# Patient Record
Sex: FEMALE | Race: WHITE | NOT HISPANIC OR LATINO | RURAL
[De-identification: names, ages, dates, MRNs, and addresses within clinical notes are randomized per-mention and may not be internally consistent; named-entity substitution may affect disease eponyms.]

---

## 2024-10-07 ENCOUNTER — OFFICE VISIT (OUTPATIENT)
Dept: FAMILY MEDICINE CLINIC | Facility: CLINIC | Age: 39
End: 2024-10-07
Payer: COMMERCIAL

## 2024-10-07 VITALS
SYSTOLIC BLOOD PRESSURE: 142 MMHG | HEART RATE: 87 BPM | OXYGEN SATURATION: 98 % | HEIGHT: 70 IN | BODY MASS INDEX: 19.18 KG/M2 | DIASTOLIC BLOOD PRESSURE: 84 MMHG | WEIGHT: 134 LBS

## 2024-10-07 DIAGNOSIS — Z13.220 SCREENING FOR HYPERLIPIDEMIA: ICD-10-CM

## 2024-10-07 DIAGNOSIS — Z11.59 ENCOUNTER FOR HEPATITIS C SCREENING TEST FOR LOW RISK PATIENT: ICD-10-CM

## 2024-10-07 DIAGNOSIS — Z00.00 GENERAL MEDICAL EXAM: Primary | ICD-10-CM

## 2024-10-07 DIAGNOSIS — R62.7 POOR WEIGHT GAIN IN ADULT: ICD-10-CM

## 2024-10-07 NOTE — PROGRESS NOTES
Office Note     Name: Blanquita Solano    : 1985     MRN: [id number]     Chief Complaint  Establish Care and Annual Exam (Pt is here for a PE today. She states that she has not been seen in around 10 years and just wants a general check up. )    Subjective     History of Present Illness:  Blanquita Solano is a 39 y.o. female who presents today for:    History of Present Illness  The patient is a 39-year-old female who presents to establish care and have a physical checkup.    She has not had a primary care physician previously, but her gynecologist is Dr. Mcgee. Her last mammogram was conducted a year ago, during which she experienced significant discomfort due to her dense breast tissue. This required her to undergo two mammograms and an ultrasound. She has undergone genetic testing for breast cancer and the results were negative.    She has no history of hospitalization except for tonsil-related issues. She does not use drugs, including intravenous drugs, inhalants, snorting substances, or sniffing substances. She has been pregnant four times, resulting in one miscarriage, two live births, and one .    She reports feeling energetic but struggles with focus. Her sleep is satisfactory, and she maintains a healthy diet and daily exercise routine. She has been attempting to gain weight to build muscle mass, but has lost 2 pounds recently. Her exercise regimen includes rock climbing and weight training. She consumes protein shakes to supplement her diet.    She does not experience anxiety or depression. She also reports no digestive issues such as constipation or diarrhea, and there is no swelling in her legs or ankles.    SOCIAL HISTORY  She used to smoke and then quit. She did cigarette for a while but that was just a year after she quit smoking. She drinks alcohol every other week. She is a stay-at-home mom. Her children are 8 and 7 years old.    FAMILY HISTORY  Her mother had breast cancer and  had double mastectomy. Her aunt  from breast cancer. They were all over 50. Her mother had hepatitis C.      Immunization History   Administered Date(s) Administered    Hep B, Adolescent or Pediatric 10/14/1999, 2000    Hepatitis B Adolescent High Risk Infant 1999    MMR 2017    Td (TDVAX) 10/14/1999      Colorectal Screening:     Last Completed Colonoscopy       This patient has no relevant Health Maintenance data.          Pap:    Last Completed Pap Smear            PAP SMEAR (Every 3 Years) Next due on 8/15/2027      08/15/2024  Done - [facility]    2016  SCANNED - PAP SMEAR                   Mammogram:    Last Completed Mammogram       This patient has no relevant Health Maintenance data.              Diet/Physical activity:Very physically active with weight and cardio    Depression: PHQ-2 Depression Screening  Little interest or pleasure in doing things? 0-->not at all   Feeling down, depressed, or hopeless? 0-->not at all   PHQ-2 Total Score 0         Review of Systems:   Review of Systems    Past Medical History: History reviewed. No pertinent past medical history.    Past Surgical History:   Past Surgical History:   Procedure Laterality Date    INDUCED       NASAL SEPTUM SURGERY      TONSILLECTOMY  2000    WISDOM TOOTH EXTRACTION         Family History:   Family History   Problem Relation Age of Onset    Breast cancer Mother 58    Breast cancer Maternal Aunt        Social History:   Social History     Socioeconomic History    Marital status: Single   Tobacco Use    Smoking status: Former     Current packs/day: 0.00     Types: Cigarettes     Quit date: 2000     Years since quittin.7     Passive exposure: Past    Smokeless tobacco: Never   Vaping Use    Vaping status: Never Used   Substance and Sexual Activity    Alcohol use: Yes     Alcohol/week: 1.0 standard drink of alcohol     Types: 1 Cans of beer per week     Comment: Monthly    Drug  "use: Not Currently    Sexual activity: Yes     Partners: Male     Birth control/protection: None       Immunizations:   Immunization History   Administered Date(s) Administered    Hep B, Adolescent or Pediatric 10/14/1999, 05/09/2000    Hepatitis B Adolescent High Risk Infant 12/08/1999    MMR 06/04/2017    Td (TDVAX) 10/14/1999        Medications:     Current Outpatient Medications:     Prenatal Vit-Fe Fumarate-FA (PRENATAL, CLASSIC, VITAMIN) 28-0.8 MG tablet tablet, Take 1 tablet by mouth daily. (Patient not taking: Reported on 10/7/2024), Disp: , Rfl:     Allergies:   No Known Allergies    Objective     Vital Signs  /84   Pulse 87   Ht 177.8 cm (70\")   Wt 60.8 kg (134 lb)   SpO2 98%   BMI 19.23 kg/m²   Estimated body mass index is 19.23 kg/m² as calculated from the following:    Height as of this encounter: 177.8 cm (70\").    Weight as of this encounter: 60.8 kg (134 lb).    Vital Signs were reviewed.    BMI is within normal parameters. No other follow-up for BMI required.      Physical Exam  Vitals and nursing note reviewed.   Constitutional:       Appearance: Normal appearance.   Neck:      Thyroid: No thyromegaly.   Cardiovascular:      Rate and Rhythm: Normal rate and regular rhythm.      Heart sounds: No murmur heard.     No friction rub. No gallop.   Pulmonary:      Effort: Pulmonary effort is normal.      Breath sounds: Normal breath sounds. No wheezing, rhonchi or rales.   Neurological:      Mental Status: She is alert.        Physical Exam         Results      Procedures     Assessment and Plan     Diagnoses:    ICD-10-CM ICD-9-CM   1. General medical exam  Z00.00 V70.9   2. Encounter for hepatitis C screening test for low risk patient  Z11.59 V73.89   3. Screening for hyperlipidemia  Z13.220 V77.91   4. Poor weight gain in adult  R62.7 783.7       Plan:    1. General medical exam      2. Encounter for hepatitis C screening test for low risk patient    - Hepatitis C Antibody    3. Screening " for hyperlipidemia    - Lipid Panel    4. Poor weight gain in adult    - Comprehensive Metabolic Panel  - TSH Rfx On Abnormal To Free T4       Assessment & Plan  1. Establishment of care.  Her Pap smear is current, and a mammogram is being arranged. Given her difficulty in gaining weight, a thyroid function test will be conducted to rule out hyperthyroidism. Additionally, cholesterol, glucose, liver, and kidney function tests will be performed to ensure overall health. A one-time hepatitis C screening will also be conducted due to her family history. Blood work will be ordered today. No new medications will be initiated at this time.    2. Health Maintenance.  She is advised to schedule her next mammogram. The importance of annual mammograms, especially given her family history of breast cancer, was discussed. She is also advised to ensure she is up to date with the Tdap vaccine, as it is flagged as due.         Healthcare Maintenance:     Blanquita Solano voices understanding and acceptance of this advice and will call back with any further questions or concerns. AVS with preventive healthcare tips printed for patient.     Counseling Provided  40 to 64: Counseling/Anticipatory Guidance Discussed: immunizations     Follow Up  Return in about 1 year (around 10/7/2025).    Patient was advised to call the office or seek medical care if  any issues discussed during this visit worsen or persist or if new concerns arise    Patient or patient representative verbalized consent for the use of Ambient Listening during the visit with  Elizabeth Alberts MD for chart documentation. 10/7/2024  09:50 EDT    Elizabeth Alberts MD  [facility]  [address] [place] [id number] [phone]

## 2024-10-08 LAB
ALBUMIN SERPL-MCNC: 4.6 G/DL (ref 3.9–4.9)
ALP SERPL-CCNC: 61 IU/L (ref 44–121)
ALT SERPL-CCNC: 14 IU/L (ref 0–32)
AST SERPL-CCNC: 19 IU/L (ref 0–40)
BILIRUB SERPL-MCNC: 0.6 MG/DL (ref 0–1.2)
BUN SERPL-MCNC: 13 MG/DL (ref 6–24)
BUN/CREAT SERPL: 19 (ref 9–23)
CALCIUM SERPL-MCNC: 9.4 MG/DL (ref 8.7–10.2)
CHLORIDE SERPL-SCNC: 103 MMOL/L (ref 96–106)
CHOLEST SERPL-MCNC: 148 MG/DL (ref 100–199)
CO2 SERPL-SCNC: 25 MMOL/L (ref 20–29)
CREAT SERPL-MCNC: 0.68 MG/DL (ref 0.57–1)
EGFRCR SERPLBLD CKD-EPI 2021: 113 ML/MIN/1.73
GLOBULIN SER CALC-MCNC: 2.3 G/DL (ref 1.5–4.5)
GLUCOSE SERPL-MCNC: 91 MG/DL (ref 70–99)
HCV IGG SERPL QL IA: NON REACTIVE
HDLC SERPL-MCNC: 66 MG/DL
LDLC SERPL CALC-MCNC: 70 MG/DL (ref 0–99)
POTASSIUM SERPL-SCNC: 4 MMOL/L (ref 3.5–5.2)
PROT SERPL-MCNC: 6.9 G/DL (ref 6–8.5)
SODIUM SERPL-SCNC: 142 MMOL/L (ref 134–144)
TRIGL SERPL-MCNC: 56 MG/DL (ref 0–149)
TSH SERPL DL<=0.005 MIU/L-ACNC: 1.84 UIU/ML (ref 0.45–4.5)
VLDLC SERPL CALC-MCNC: 12 MG/DL (ref 5–40)

## 2024-10-18 ENCOUNTER — PATIENT ROUNDING (BHMG ONLY) (OUTPATIENT)
Dept: FAMILY MEDICINE CLINIC | Facility: CLINIC | Age: 39
End: 2024-10-18
Payer: COMMERCIAL

## 2024-10-18 NOTE — PROGRESS NOTES
October 18, 2024    Hello, may I speak with Blanquita Solano?    My name is Dee      I am  with [facility]  [address]  [facility] [address] [phone].    Before we get started may I verify your date of birth? 1985    I am calling to officially welcome you to our practice and ask about your recent visit. Is this a good time to talk? yes    Tell me about your visit with us. What things went well?  Dr Ablerts was very friendly and professional, she explained everything very well and listened to her concerns       We're always looking for ways to make our patients' experiences even better. Do you have recommendations on ways we may improve?  no    Overall were you satisfied with your first visit to our practice? yes       I appreciate you taking the time to speak with me today. Is there anything else I can do for you? no      Thank you, and have a great day.

## 2025-03-26 ENCOUNTER — E-VISIT (OUTPATIENT)
Dept: FAMILY MEDICINE CLINIC | Facility: TELEHEALTH | Age: 40
End: 2025-03-26
Payer: COMMERCIAL

## 2025-03-26 PROCEDURE — FABRICHEALTHVISIT: Performed by: NURSE PRACTITIONER

## 2025-03-26 NOTE — E-VISIT TREATED
Date: 2025 14:27:32  Clinician: Amanda Montilla  Clinician NPI: [id number]  Patient: Blanquita Mckeon  Patient : 1985  Patient Address: [address], [place] [address]  Patient Phone: [phone]  Visit Protocol: Acne and rosacea  Patient Summary:  Blanquita is a 39 year old ( : 1985 ) female who initiated a visit for acne.    Images of the skin condition were uploaded.  Blanquita is not using prescription treatment for acne, but did in the past.  Blanquita was diagnosed with acne   more than 5 years ago. The last in-person visit with a provider for this condition was more than 2 years ago.   Symptom details  The symptoms are located on the cheeks and on the chin.   Present symptoms:     Whiteheads    Cysts: 2-4 are present     Redness, flushing or dusky brown discoloration    Dry patches of skin that appear swollen    Scarring    Keloids     Blanquita has a combination of dry and oily skin and is Type 3: fair to medium natural skin. Blanquita is not sure if their skin is sensitive.   Denies: burning sensation of the skin, thickened areas of the skin, small, raised bumps (red or yellowish brown   in color), red lines or patterns on the skin, blackheads, and eye irritation.  Blanquita has a history of dry patches of skin that appear swollen, whiteheads, and cysts.  Pertinent medical history  Blanquita does not have excessive hair growth on unexpected   areas of the body, such as the upper lip, under the chin, chest, and back. Blanquita does not have male pattern hair loss (hair loss on the top or front of the scalp).   The acne regularly worsens at the same points during the menstrual cycle.   Blanquita is   not currently taking an oral contraceptive.   Blanquita would not consider taking an oral contraceptive to treat acne.   The following acne treatments have been effective in the past:   Oral antibiotics   Blanquita has previously used over-the-counter acne   treatments.  The over-the-counter acne  treatments have not been effective.    Blanquita does not smoke or use smokeless tobacco.   Blanquita denies pregnancy and denies breastfeeding.     MEDICATIONS: No current medications, ALLERGIES: NKDA  Clinician Response:  Dear Blanquita,  Based on the information you have provided, you have moderate acne.  Acne is caused by dead skin cells building up in the pores. When dead skin cells combine with oil, the pores become blocked, causing bacteria to   become trapped and pimples to form. Acne treatment is aimed at clearing the dead skin cells, excess oils, and bacteria from the pores.  Although many believe it to be true, acne is not caused by uncleanliness. In fact, washing your face too much can   cause irritation making acne worse.  Medication information  I am going to provide you with treatment today, but there are more aggressive therapies for severe acne. However, you need to be seen in person to determine whether any of these are right for   you.  If you would like to discuss further options, please make an appointment to be seen in the clinic.  I am prescribing:     Clindamycin phosphate (Cleocin T) 1% topical lotion. Apply to the affected skin 2 times per day. Your prescription includes 3 refills.   If you become pregnant during this course of treatment, stop taking the medication and contact your primary care   provider.  Unless you are allergic to the over-the-counter medication(s) below, I recommend using:     Benzoyl peroxide 2.5% topical cream. Apply daily in the morning to clean, dry skin.   Over-the-counter medications do not require a prescription. Ask the pharmacist if you have any questions.  If you are using a treatment you have not used before, apply   a small amount of the product to 1 or 2 small affected areas of the skin for 3 days. If no discomfort or reaction occurs, you may use the product on commonly affected areas of the face. Do not use the products as a spot treatment.    Both prescription and  over-the-counter medication can cause an allergic reaction even if you have taken them without a problem in the past. If you develop a new rash, swelling, or difficulty breathing, stop the medication and be seen in a clinic or   urgent care immediately.  Self care  Take the following steps to best control your condition:     Avoid washing the skin more than 2 times per day    Avoid touching blemishes, as this leads to more redness and irritation    If your skin becomes irritated, use a gentle, fragrance-free moisturizer containing aloe vera.    Be careful when using dietary supplements, such as whey protein, that can worsen acne.    A broad-spectrum sunscreen, SPF 30 or higher, is recommended for all skin types. Reapply every 2 hours.    Decreasing your sugar intake and eating more citrus fruits and green leafy vegetables will reduce inflammation and help in regulating hormone levels    Aim for 8 glasses of water per day for healthy, clear skin    Avoid processed foods such as white bread, potato chips, and fries    Oil-based hair products are a potential cause of breakouts on the forehead. Applying products only to the mid-scalp and ends of the hair can help in prevention     When to seek care  Please make an appointment to be seen in a clinic if any of the following occur:     You have only minimal improvement after 8 to 12 weeks of treatment    You experience side effects that make following the recommendations in this treatment plan difficult    You need help coping with your condition      Diagnosis: Moderate acne  Diagnosis ICD: L70.0    Follow up instructions: ATTENTION: If you have been prescribed medications, your prescriptions will not be sent until you choose your pharmacy.  To do so open the link within your notification, or go to [employer] and click eVisit in the menu to open your   treatment plan. From there, you can select your pharmacy at the bottom of your after visit summary. You can also go to  [email or web address]  Prescriptions  Prescription: clindamycin phosphate (Cleocin T) 1 % topical lotion, apply to the affected skin 2 times per day

## 2025-03-28 ENCOUNTER — TELEMEDICINE (OUTPATIENT)
Dept: FAMILY MEDICINE CLINIC | Facility: TELEHEALTH | Age: 40
End: 2025-03-28
Payer: COMMERCIAL

## 2025-03-28 DIAGNOSIS — L70.9 ACNE, UNSPECIFIED ACNE TYPE: Primary | ICD-10-CM

## 2025-03-28 RX ORDER — CLINDAMYCIN PHOSPHATE 10 MG/G
1 GEL TOPICAL 2 TIMES DAILY PRN
Qty: 60 G | Refills: 0 | Status: SHIPPED | OUTPATIENT
Start: 2025-03-28 | End: 2025-04-27

## 2025-03-28 NOTE — PROGRESS NOTES
No chief complaint on file.      Video Visit Reason:   Free Text Description: cystic ance and rash. would like bactrim,as it has worked well in the past  Subjective   Blanquita Solano is a 39 y.o. female.     History of Present Illness  The patient presents via virtual visit for evaluation of cystic acne. She is requesting bactrim as treatment for her acne. She reports that she has had it in the past and it has worked for her in the past.    She has not previously utilized clindamycin gel as a treatment option.    ALLERGIES  The patient has no known allergies.        The following portions of the patient's history were reviewed and updated as appropriate: allergies, current medications, past medical history, and problem list.      History reviewed. No pertinent past medical history.  Social History     Socioeconomic History    Marital status:    Tobacco Use    Smoking status: Former     Current packs/day: 0.00     Types: Cigarettes     Quit date: 2000     Years since quittin.2     Passive exposure: Past    Smokeless tobacco: Never   Vaping Use    Vaping status: Never Used   Substance and Sexual Activity    Alcohol use: Yes     Alcohol/week: 1.0 standard drink of alcohol     Types: 1 Cans of beer per week     Comment: Monthly    Drug use: Not Currently    Sexual activity: Yes     Partners: Male     Birth control/protection: None     medication documentation: reviewed and updated with patient and   Current Outpatient Medications:     clindamycin (Clindamycin 1% external gel) 1 % gel, Apply 1 Application topically to the appropriate area as directed 2 (Two) Times a Day As Needed (acne) for up to 30 days., Disp: 60 g, Rfl: 0  Review of Systems  See HPI  Objective   Physical Exam  Constitutional:       General: She is not in acute distress.  Skin:     Findings: Acne present.   Neurological:      Mental Status: She is alert.         Assessment & Plan   Diagnoses and all orders for this visit:    1. Acne,  unspecified acne type (Primary)  -     clindamycin (Clindamycin 1% external gel) 1 % gel; Apply 1 Application topically to the appropriate area as directed 2 (Two) Times a Day As Needed (acne) for up to 30 days.  Dispense: 60 g; Refill: 0     Her facial skin does not exhibit signs of infection. A prescription for clindamycin gel has been issued. She has been thoroughly educated as to why bactrim is not the best option for typical acne. She has been advised to consult with her primary care physician for further evaluation and potential referral to a dermatologist. Information regarding the medication and its usage will be sent to her [employer]. If there are any issues at the pharmacy, she should message through [employer] for assistance.             Follow Up:  If your symptoms are not resolving by the completion of your treatment or are worsening, see your primary care provider for follow up. If you don't have a primary care provider, you may go to any Urgent Care for re-evaluation. If you develop any life threatening symptoms, go to the nearest Emergency Department immediately or call EMS.       Patient or patient representative verbalized consent for the use of Ambient Listening during the visit with  LYNNETTE Barrett for chart documentation. 3/28/2025  10:05 EDT        The use of  Video Visit was utilized during this visit, using both [employer] and [facility]. The use of a video visit has been reviewed with the patient and verbal informed consent has been obtained. No technical difficulties occurred during the visit.    is located at [facility] [address] [facility] [address]  Provider is located at [place], [place]

## 2025-03-28 NOTE — PATIENT INSTRUCTIONS
It is recommended that you follow up with PCP or Dermatologist for progress check ups and refills if needed.      Clindamycin Topical Gel or Solution  What is this medication?  CLINDAMYCIN (ANDRY Pichardo) treats acne. It works by killing or preventing the growth of bacteria on your skin. It belongs to a group of medications called antibiotics.  This medicine may be used for other purposes; ask your health care provider or pharmacist if you have questions.  COMMON BRAND NAME(S): Cleocin T, Clinda-Derm, Clindagel, ClindaMax  What should I tell my care team before I take this medication?  They need to know if you have any of these conditions:  Inflammatory bowel disease, such as Crohn disease or ulcerative colitis  Large areas of burned or damaged skin  Skin conditions or sensitivity  Stomach or intestine problems  An unusual or allergic reaction to clindamycin, other medications, foods, dyes, or preservatives  Pregnant or trying to get pregnant  Breastfeeding  How should I use this medication?  This medication is for external use only. Do not take by mouth. Wash your hands before and after use. If you are treating a hand infection, only wash your hands before use. Do not get it in your eyes. If you do, rinse your eyes with plenty of cool tap water. Use it as directed on the prescription label at the same time every day. Do not use it more often than directed. Use the medication for the full course as directed by your care team, even if you think you are better. Do not stop using it unless your care team tells you to stop it early.  Apply a thin film of the medication to the affected area.  Talk to your care team about the use of this medication in children. Special care may be needed.  Overdosage: If you think you have taken too much of this medicine contact a poison control center or emergency room at once.  NOTE: This medicine is only for you. Do not share this medicine with others.  What if I miss a dose?  If you  miss a dose, take it as soon as you can. If it is almost time for your next dose, take only that dose. Do not take double or extra doses.  What may interact with this medication?  Medicated cosmetics, including coverup preparations  Other acne products including benzoyl peroxide, salicylic acid, or tretinoin  Skin care products that have a high alcohol content (some shaving creams, lotions, or after shave lotion)  Some skin cleansers or medicated soaps  This list may not describe all possible interactions. Give your health care provider a list of all the medicines, herbs, non-prescription drugs, or dietary supplements you use. Also tell them if you smoke, drink alcohol, or use illegal drugs. Some items may interact with your medicine.  What should I watch for while using this medication?  Visit your care team for regular checks on your progress. It may be some time before you see the benefit from this medication. Tell your care team if your symptoms do not start to get better or if they get worse. Do not treat diarrhea with over the counter products. Contact your care team if you have diarrhea that lasts more than 2 days or if it is severe and watery.  What side effects may I notice from receiving this medication?  Side effects that you should report to your care team as soon as possible:  Allergic reactions--skin rash, itching, hives, swelling of the face, lips, tongue, or throat  Severe diarrhea, fever  Side effects that usually do not require medical attention (report these to your care team if they continue or are bothersome):  Mild skin irritation, redness, or dryness  This list may not describe all possible side effects. Call your doctor for medical advice about side effects. You may report side effects to FDA at 9-516-FDA-3788.  Where should I keep my medication?  Keep out of the reach of children and pets. Store at room temperature between 20 and 25 degrees C (68 and 77 degrees F). Do not freeze. Throw away  any unused medication after the expiration date.  NOTE: This sheet is a summary. It may not cover all possible information. If you have questions about this medicine, talk to your doctor, pharmacist, or health care provider.  © 2024 [employer] (2024-11-29 00:00:00)

## 2025-04-08 ENCOUNTER — OFFICE VISIT (OUTPATIENT)
Dept: FAMILY MEDICINE CLINIC | Facility: CLINIC | Age: 40
End: 2025-04-08
Payer: COMMERCIAL

## 2025-04-08 VITALS
DIASTOLIC BLOOD PRESSURE: 68 MMHG | HEIGHT: 70 IN | BODY MASS INDEX: 20.04 KG/M2 | OXYGEN SATURATION: 99 % | HEART RATE: 82 BPM | WEIGHT: 140 LBS | SYSTOLIC BLOOD PRESSURE: 110 MMHG

## 2025-04-08 DIAGNOSIS — L71.9 ROSACEA: Primary | ICD-10-CM

## 2025-04-08 PROCEDURE — 99213 OFFICE O/P EST LOW 20 MIN: CPT | Performed by: INTERNAL MEDICINE

## 2025-04-08 RX ORDER — METRONIDAZOLE 10 MG/G
GEL TOPICAL
Qty: 60 G | Refills: 1 | Status: SHIPPED | OUTPATIENT
Start: 2025-04-08

## 2025-04-08 NOTE — PROGRESS NOTES
Office Note     Name: Blanquita Solano    : 1985     MRN: [id number]     Chief Complaint  Acne (Pt saw the dermatologist recently and they prescribed clindamycin gel and it's not helping.)    Subjective     History of Present Illness:  Blanquita Solano is a 39 y.o. female who presents today for:      Patient recently was seen by virtual visit telemedicine nurse practitioner for acne diagnosis and was treated with 1% topical clindamycin gel approximately 5 to 6 weeks ago    Review of Systems:   Review of Systems    Past Medical History:   Past Medical History:   Diagnosis Date    Urinary tract infection        Past Surgical History:   Past Surgical History:   Procedure Laterality Date    INDUCED       NASAL SEPTUM SURGERY      TONSILLECTOMY      WISDOM TOOTH EXTRACTION         Family History:   Family History   Problem Relation Age of Onset    Breast cancer Mother 58    Cancer Mother         breast    Breast cancer Maternal Aunt     Cancer Maternal Aunt         breast       Social History:   Social History     Socioeconomic History    Marital status:    Tobacco Use    Smoking status: Former     Current packs/day: 0.00     Types: Cigarettes     Quit date: 2000     Years since quittin.3     Passive exposure: Past    Smokeless tobacco: Never   Vaping Use    Vaping status: Never Used   Substance and Sexual Activity    Alcohol use: Yes     Alcohol/week: 1.0 standard drink of alcohol     Types: 1 Cans of beer per week     Comment: maybe 2 twice a month    Drug use: Never    Sexual activity: Yes     Partners: Male     Birth control/protection: I.U.D.       Immunizations:   Immunization History   Administered Date(s) Administered    COVID-19 (MODERNA) 1st,2nd,3rd Dose Monovalent 2021, 2021    Hep B, Adolescent or Pediatric 10/14/1999, 2000    Hepatitis B Adolescent High Risk Infant 1999    MMR 2017    Td (TDVAX) 10/14/1999        Medications:  "    Current Outpatient Medications:     clindamycin (Clindamycin 1% external gel) 1 % gel, Apply 1 Application topically to the appropriate area as directed 2 (Two) Times a Day As Needed (acne) for up to 30 days., Disp: 60 g, Rfl: 0    metroNIDAZOLE (Metrogel) 1 % gel, (Apply 0.5 gm or thin layer once a day), Disp: 60 g, Rfl: 1    Allergies:   No Known Allergies    Objective     Vital Signs  /68   Pulse 82   Ht 177.8 cm (70\")   Wt 63.5 kg (140 lb)   SpO2 99%   BMI 20.09 kg/m²   Estimated body mass index is 20.09 kg/m² as calculated from the following:    Height as of this encounter: 177.8 cm (70\").    Weight as of this encounter: 63.5 kg (140 lb).    Vital Signs were reviewed.    BMI is within normal parameters. No other follow-up for BMI required.      Physical Exam  Vitals and nursing note reviewed.   Constitutional:       Appearance: Normal appearance.   Cardiovascular:      Rate and Rhythm: Normal rate and regular rhythm.      Heart sounds: No murmur heard.     No friction rub. No gallop.   Pulmonary:      Effort: Pulmonary effort is normal.      Breath sounds: Normal breath sounds. No wheezing, rhonchi or rales.   Skin:     Comments: Erythematous papules over nasal and maxillary area   Neurological:      Mental Status: She is alert.        Physical Exam         Results      Procedures     Assessment and Plan     Diagnoses:    ICD-10-CM ICD-9-CM   1. Rosacea  L71.9 695.3       Plan:    1. Rosacea    - metroNIDAZOLE (Metrogel) 1 % gel; (Apply 0.5 gm or thin layer once a day)  Dispense: 60 g; Refill: 1  - Ambulatory Referral to Dermatology       Assessment & Plan  1. Suspected rosacea.  The patient's symptoms, primarily localized on the cheeks, suggest a possible diagnosis of rosacea. A prescription for MetroGel (metronidazole) has been issued, with instructions to apply a thin layer once daily after her regular facial care routine. She is advised to remove any makeup before bedtime and then apply the " gel. A referral to dermatology has also been initiated for further evaluation and management. If the MetroGel is ineffective, dermatology will explore other treatment options.         Follow Up  No follow-ups on file.    Patient was advised to call the office or seek medical care if  any issues discussed during this visit worsen or persist or if new concerns arise    Patient or patient representative verbalized consent for the use of Ambient Listening during the visit with  Elizabeth Alberts MD for chart documentation. 4/22/2025  08:06 EDT    Elizabeth Alberts MD  [facility]  [address] [place] [id number]  [phone]  Answers submitted by the patient for this visit:  Rash Questionnaire (Submitted on 4/7/2025)  Chief Complaint: Rash  Chronicity: chronic  Onset: more than 1 month ago  Progression since onset: worsening  Affected locations: face  Characteristics: itchiness, peeling, scaling  Exposed to: unknown  anorexia: No  facial edema: No  joint pain: No  nail changes: No  Additional Information: Could be acne, could be rosacea.